# Patient Record
Sex: MALE | Race: WHITE | NOT HISPANIC OR LATINO | Employment: FULL TIME | URBAN - METROPOLITAN AREA
[De-identification: names, ages, dates, MRNs, and addresses within clinical notes are randomized per-mention and may not be internally consistent; named-entity substitution may affect disease eponyms.]

---

## 2017-08-03 ENCOUNTER — TELEPHONE (OUTPATIENT)
Dept: CARDIOLOGY | Facility: CLINIC | Age: 72
End: 2017-08-03

## 2017-08-03 DIAGNOSIS — I35.1 AORTIC VALVE INSUFFICIENCY, UNSPECIFIED ETIOLOGY: Primary | ICD-10-CM

## 2017-08-03 NOTE — TELEPHONE ENCOUNTER
The patient called requesting to have an echo prior to re-evaluation with Dr. Cotton for aortic insufficiency.  Per MRJ I advised that an echo will be ordered and he should expect a call from procedure scheduling to facilitate this.  Understanding verbalized at this time.

## 2017-09-21 ENCOUNTER — HOSPITAL ENCOUNTER (OUTPATIENT)
Dept: GENERAL RADIOLOGY | Facility: HOSPITAL | Age: 72
Discharge: HOME | End: 2017-09-21
Attending: INTERNAL MEDICINE

## 2017-09-21 ENCOUNTER — TRANSCRIBE ORDERS (OUTPATIENT)
Dept: ADMINISTRATIVE | Facility: HOSPITAL | Age: 72
End: 2017-09-21

## 2017-09-21 ENCOUNTER — HOSPITAL ENCOUNTER (OUTPATIENT)
Dept: CARDIOLOGY | Facility: HOSPITAL | Age: 72
Discharge: HOME | End: 2017-09-21
Attending: INTERNAL MEDICINE | Admitting: INTERNAL MEDICINE

## 2017-09-21 ENCOUNTER — OFFICE VISIT (OUTPATIENT)
Dept: CARDIOLOGY | Facility: CLINIC | Age: 72
End: 2017-09-21

## 2017-09-21 VITALS
HEIGHT: 70 IN | DIASTOLIC BLOOD PRESSURE: 63 MMHG | WEIGHT: 205 LBS | HEART RATE: 47 BPM | BODY MASS INDEX: 29.35 KG/M2 | SYSTOLIC BLOOD PRESSURE: 140 MMHG

## 2017-09-21 VITALS
DIASTOLIC BLOOD PRESSURE: 74 MMHG | BODY MASS INDEX: 29.63 KG/M2 | SYSTOLIC BLOOD PRESSURE: 171 MMHG | WEIGHT: 207 LBS | HEIGHT: 70 IN

## 2017-09-21 DIAGNOSIS — I38 VALVULAR HEART DISEASE: ICD-10-CM

## 2017-09-21 DIAGNOSIS — R06.09 DYSPNEA ON EXERTION: ICD-10-CM

## 2017-09-21 DIAGNOSIS — R06.02 SHORTNESS OF BREATH: ICD-10-CM

## 2017-09-21 DIAGNOSIS — R06.02 SHORTNESS OF BREATH: Primary | ICD-10-CM

## 2017-09-21 DIAGNOSIS — I35.1 AORTIC VALVE INSUFFICIENCY, UNSPECIFIED ETIOLOGY: ICD-10-CM

## 2017-09-21 DIAGNOSIS — I10 ESSENTIAL HYPERTENSION: Primary | ICD-10-CM

## 2017-09-21 LAB
BH CV ECHO MEAS - AI DEC SLOPE: 225 CM/SEC^2
BH CV ECHO MEAS - AI MAX PG: 64.2 MMHG
BH CV ECHO MEAS - AI MAX VEL: 400.1 CM/SEC
BH CV ECHO MEAS - AI P1/2T: 520.8 MSEC
BH CV ECHO MEAS - AO MAX PG (FULL): 0.55 MMHG
BH CV ECHO MEAS - AO MAX PG: 8.3 MMHG
BH CV ECHO MEAS - AO MEAN PG (FULL): 0.42 MMHG
BH CV ECHO MEAS - AO MEAN PG: 3.7 MMHG
BH CV ECHO MEAS - AO ROOT AREA (BSA CORRECTED): 1.6
BH CV ECHO MEAS - AO ROOT AREA: 9 CM^2
BH CV ECHO MEAS - AO ROOT DIAM: 3.4 CM
BH CV ECHO MEAS - AO V2 MAX: 144.3 CM/SEC
BH CV ECHO MEAS - AO V2 MEAN: 88.3 CM/SEC
BH CV ECHO MEAS - AO V2 VTI: 33.3 CM
BH CV ECHO MEAS - AVA(I,A): 4.3 CM^2
BH CV ECHO MEAS - AVA(I,D): 4.3 CM^2
BH CV ECHO MEAS - AVA(V,A): 4.1 CM^2
BH CV ECHO MEAS - AVA(V,D): 4.1 CM^2
BH CV ECHO MEAS - BSA(HAYCOCK): 2.2 M^2
BH CV ECHO MEAS - BSA: 2.1 M^2
BH CV ECHO MEAS - BZI_BMI: 29.7 KILOGRAMS/M^2
BH CV ECHO MEAS - BZI_METRIC_HEIGHT: 177.8 CM
BH CV ECHO MEAS - BZI_METRIC_WEIGHT: 93.9 KG
BH CV ECHO MEAS - CONTRAST EF (2CH): 76.3 ML/M^2
BH CV ECHO MEAS - CONTRAST EF 4CH: 73.9 ML/M^2
BH CV ECHO MEAS - EDV(CUBED): 204.4 ML
BH CV ECHO MEAS - EDV(MOD-SP2): 135 ML
BH CV ECHO MEAS - EDV(MOD-SP4): 180 ML
BH CV ECHO MEAS - EDV(TEICH): 172.6 ML
BH CV ECHO MEAS - EF(CUBED): 81.6 %
BH CV ECHO MEAS - EF(MOD-SP2): 76.3 %
BH CV ECHO MEAS - EF(MOD-SP4): 73.9 %
BH CV ECHO MEAS - EF(TEICH): 73.5 %
BH CV ECHO MEAS - ESV(CUBED): 37.7 ML
BH CV ECHO MEAS - ESV(MOD-SP2): 32 ML
BH CV ECHO MEAS - ESV(MOD-SP4): 47 ML
BH CV ECHO MEAS - ESV(TEICH): 45.8 ML
BH CV ECHO MEAS - FS: 43.1 %
BH CV ECHO MEAS - IVS/LVPW: 1
BH CV ECHO MEAS - IVSD: 0.99 CM
BH CV ECHO MEAS - LA DIMENSION: 3.9 CM
BH CV ECHO MEAS - LA/AO: 1.2
BH CV ECHO MEAS - LAT PEAK E' VEL: 5 CM/SEC
BH CV ECHO MEAS - LV DIASTOLIC VOL/BSA (35-75): 85 ML/M^2
BH CV ECHO MEAS - LV MASS(C)D: 235.1 GRAMS
BH CV ECHO MEAS - LV MASS(C)DI: 111 GRAMS/M^2
BH CV ECHO MEAS - LV MAX PG: 7.8 MMHG
BH CV ECHO MEAS - LV MEAN PG: 3.3 MMHG
BH CV ECHO MEAS - LV SYSTOLIC VOL/BSA (12-30): 22.2 ML/M^2
BH CV ECHO MEAS - LV V1 MAX: 139.4 CM/SEC
BH CV ECHO MEAS - LV V1 MEAN: 81.9 CM/SEC
BH CV ECHO MEAS - LV V1 VTI: 33.2 CM
BH CV ECHO MEAS - LVIDD: 5.9 CM
BH CV ECHO MEAS - LVIDS: 3.4 CM
BH CV ECHO MEAS - LVLD AP2: 8.2 CM
BH CV ECHO MEAS - LVLD AP4: 8.6 CM
BH CV ECHO MEAS - LVLS AP2: 6.9 CM
BH CV ECHO MEAS - LVLS AP4: 7.3 CM
BH CV ECHO MEAS - LVOT AREA (M): 4.2 CM^2
BH CV ECHO MEAS - LVOT AREA: 4.3 CM^2
BH CV ECHO MEAS - LVOT DIAM: 2.3 CM
BH CV ECHO MEAS - LVPWD: 0.98 CM
BH CV ECHO MEAS - MED PEAK E' VEL: 8.06 CM/SEC
BH CV ECHO MEAS - MV A MAX VEL: 68.2 CM/SEC
BH CV ECHO MEAS - MV DEC SLOPE: 296 CM/SEC^2
BH CV ECHO MEAS - MV DEC TIME: 0.44 SEC
BH CV ECHO MEAS - MV E MAX VEL: 54.5 CM/SEC
BH CV ECHO MEAS - MV E/A: 0.8
BH CV ECHO MEAS - MV P1/2T MAX VEL: 428 CM/SEC
BH CV ECHO MEAS - MV P1/2T: 423.5 MSEC
BH CV ECHO MEAS - MVA P1/2T LCG: 0.51 CM^2
BH CV ECHO MEAS - MVA(P1/2T): 0.52 CM^2
BH CV ECHO MEAS - PA ACC SLOPE: 485.8 CM/SEC^2
BH CV ECHO MEAS - PA ACC TIME: 0.13 SEC
BH CV ECHO MEAS - PA PR(ACCEL): 20.8 MMHG
BH CV ECHO MEAS - PULM DIAS VEL: 30.5 CM/SEC
BH CV ECHO MEAS - PULM S/D: 2.2
BH CV ECHO MEAS - PULM SYS VEL: 66.9 CM/SEC
BH CV ECHO MEAS - RVDD: 3.3 CM
BH CV ECHO MEAS - SI(AO): 141.8 ML/M^2
BH CV ECHO MEAS - SI(CUBED): 78.7 ML/M^2
BH CV ECHO MEAS - SI(LVOT): 66.8 ML/M^2
BH CV ECHO MEAS - SI(MOD-SP2): 48.6 ML/M^2
BH CV ECHO MEAS - SI(MOD-SP4): 62.8 ML/M^2
BH CV ECHO MEAS - SI(TEICH): 59.9 ML/M^2
BH CV ECHO MEAS - SV(AO): 300.4 ML
BH CV ECHO MEAS - SV(CUBED): 166.8 ML
BH CV ECHO MEAS - SV(LVOT): 141.6 ML
BH CV ECHO MEAS - SV(MOD-SP2): 103 ML
BH CV ECHO MEAS - SV(MOD-SP4): 133 ML
BH CV ECHO MEAS - SV(TEICH): 126.8 ML
BH CV ECHO MEAS - TAPSE (>1.6): 2.9 CM2
BH CV XLRA - RV BASE: 3 CM
BH CV XLRA - RV LENGTH: 7 CM
BH CV XLRA - RV MID: 3 CM
BH CV XLRA - TDI S': 14.9 CM/SEC
E/E' RATIO: 8.8
LEFT ATRIUM VOLUME INDEX: 38.2 ML/M2

## 2017-09-21 PROCEDURE — 93000 ELECTROCARDIOGRAM COMPLETE: CPT | Performed by: INTERNAL MEDICINE

## 2017-09-21 PROCEDURE — 71020 HC CHEST PA AND LATERAL: CPT

## 2017-09-21 PROCEDURE — 93306 TTE W/DOPPLER COMPLETE: CPT

## 2017-09-21 PROCEDURE — 93306 TTE W/DOPPLER COMPLETE: CPT | Performed by: INTERNAL MEDICINE

## 2017-09-21 PROCEDURE — 99203 OFFICE O/P NEW LOW 30 MIN: CPT | Performed by: INTERNAL MEDICINE

## 2017-09-21 RX ORDER — AMLODIPINE BESYLATE 10 MG/1
10 TABLET ORAL DAILY
Qty: 30 TABLET | Refills: 11 | Status: SHIPPED | OUTPATIENT
Start: 2017-09-21 | End: 2017-10-18

## 2017-09-21 RX ORDER — VALSARTAN 320 MG/1
320 TABLET ORAL DAILY
COMMUNITY
Start: 2017-09-10 | End: 2018-09-25

## 2017-09-21 RX ORDER — CHLORTHALIDONE 50 MG/1
50 TABLET ORAL DAILY
Qty: 30 TABLET | Refills: 11 | Status: SHIPPED | OUTPATIENT
Start: 2017-09-21 | End: 2020-10-06 | Stop reason: SDUPTHER

## 2017-09-21 RX ORDER — DICLOFENAC SODIUM 75 MG/1
75 TABLET, DELAYED RELEASE ORAL DAILY
COMMUNITY
Start: 2017-08-01

## 2017-09-21 RX ORDER — TADALAFIL 5 MG
5 TABLET ORAL DAILY
COMMUNITY
Start: 2017-08-20

## 2017-09-21 NOTE — PROGRESS NOTES
[facility]  INITIAL OFFICE CONSULT    Suad Perry  : 1945  MRN:[id number]  Home Phone:[phone]  Patient Address: [address]   [place] [address]    Date of Encounter: 2017    PCP: Rima Adams MD  [id number]   [facility] [place] [facility] [address]  Referring MD: Dr. Adams    IDENTIFICATION: A 71 y.o.  white male, , resident of [place], [place]     Chief Complaint   Patient presents with   • Shortness of Breath       PROBLEM LIST:   1. Dyspnea on exertion:  a. Cardiopulmonary exercise testing (CPX) done 10/02/2014 showing normal spirometry and exercise tolerance, stopped test due to blood pressure of 205/104 after only 5 minutes.   b. Left heart catheterization, 2014:  Showing normal LV function, normal pulmonary artery pressures, normal coronary arteries and moderate to severe aortic insufficiency.    2. Valvular heart disease:   a. Echocardiogram, 2014:  Ejection fraction of 55% to 60%, bicuspid aortic valve with moderate aortic insufficiency, mild tricuspid regurgitation, trace mitral regurgitation, RVSP of 25-30 mmHg.    b. Transesophageal echocardiogram, 2014:  Showing bicuspid aortic valve with moderate aortic insufficiency, mild mitral regurgitation with bileaflet prolapse.    c. Echocardiogram 2017: Normal EF, mild MR, aortic valve sclerosis with mild AI, no PAH  3. Hypertension  4. Benign prostatic hypertrophy/nocturia.   5. Osteoarthritis.   6. Macular degeneration.   7. Suspected obstructive sleep apnea, refused sleep study.   8. History of hypothyroidism, “low functioning” thyroid:   a. No longer on medication with a normal TSH.    9. Surgical history:   a. Bilateral knee replacement.   b. Right inguinal hernia repair.      ALLERGIES: No Known Allergies    CURRENT MEDICATIONS:   •  CIALIS 5 MG tablet,  Daily  •  Diclofenac 75 MG EC tablet, Daily.  •  valsartan 320 MG tablet, Daily.    HPI: Mr. Perry  is a pleasant 70 y/o WM with history of VHD, and HTN who presents to re-establish care for increased VILLEGAS. He was previously evaluated in our office for VILLEGAS and was found to have normal coronaries, and moderate AI. He has noticed increased shortness of breath with exertion, only when walking up an incline or going up a flight of stairs. He does not get short of breath when walking on a flat surface. He also notices increased shortness of breath with bending over to tie his shoes. He remains active and works out 2 times per week. He denies chest pain, palpitations, syncope or near syncope. Denies orthopnea, PND or lower extremity edema. He continues to work as a . He does not check his blood pressure at home, and is surprised it is this high today. No tobacco, occasional alcohol use. No significant family history of heart disease. Denies history of CVA, MI, DVT/PE or rheumatic fever.      Cardiac Risk Factors include: advanced age (older than 55 for men, 65 for women), hypertension and male gender    ROS: All systems have been reviewed and are negative with the exception of those mentioned in the HPI and problem list above.    Surgical History:   Past Surgical History:   Procedure Laterality Date   • HERNIA REPAIR      Right inguinal hernia repair.     • REPLACEMENT TOTAL KNEE BILATERAL         Social History:   Social History     Social History   • Marital status:      Spouse name: N/A   • Number of children: N/A   • Years of education: N/A     Occupational History   • Not on file.     Social History Main Topics   • Smoking status: Never Smoker   • Smokeless tobacco: Never Used   • Alcohol use 1.8 - 3.6 oz/week     1 - 2 Glasses of wine, 1 - 2 Cans of beer, 1 - 2 Shots of liquor per week      Comment: weekends   • Drug use: No   • Sexual activity: Defer       Family History:   Family History   Problem Relation Age of Onset   • Osteoarthritis Mother    • Heart disease Father      CAD   •  "Hypertension Sister    • Hypertension Sister        Objective     Vitals:    09/21/17 1118 09/21/17 1124 09/21/17 1125 09/21/17 1126   BP: (!) 199/70 (!) 185/70 174/64 140/63   BP Location: Right arm Right arm Left arm Left arm   Patient Position: Sitting Standing Sitting Standing   Pulse: (!) 44 (!) 46 (!) 43 (!) 47   Weight: 205 lb (93 kg)      Height: 70\" (177.8 cm)        Body mass index is 29.41 kg/(m^2).    PHYSICAL EXAM:  Constitutional:  Well-nourished, cooperative, in no acute distress.   Head:  Normocephalic, without obvious abnormality, atraumatic.   Neck: No adenopathy, supple, trachea midline, no thyromegaly, no JVD, murmur radiating to carotids   Respiratory:   Clear to auscultation bilaterally; respirations regular, even and unlabored. No wheezes, rales or ronchi.    Cardiovascular:  Regular rhythm and normal rate, II/VI systolic murmur, no gallop, no rub, no click.   Pulses: Peripheral pulses are present and equal bilaterally.   GI:   Soft, non-distended. Bowel sounds heard throughout.  Non-tender to palpation, no guarding.   Extremities: No edema, clubbing or cyanosis.   Skin: Skin is warm and dry. No bleeding, bruising or rash.   Neurological: Alert, oriented to time, person and place. No focal deficits.     Labs/Diagnostic Data    TSH 5/3/2017: 3.43      ECG 12 Lead  Date/Time: 9/21/2017 3:33 PM  Performed by: ASIYA ROSARIO  Authorized by: ASIYA ROSARIO   Rhythm: sinus bradycardia  Rate: bradycardic  BPM: 41  Conduction: 1st degree  ST Segments: ST segments normal  T Waves: T waves normal  Clinical impression: abnormal ECG        Radiology Data:   CXR 9/21/17: FINDINGS: Cardiac size enlarged without increased pulmonary vascularity,  focal airspace opacity or overt edema. No pneumothorax or significant  pleural effusion. No acute osseous abnormality.          IMPRESSION:  Cardiomegaly without evidence of overt edema or focal  pulmonary opacification.          Assessment and Plan: "     1. His echocardiogram today shows mild left sided chamber enlargement, with normal EF. He has mild AI and mild MR.   2. His blood pressure is too high today which may be contributing to his symptoms. We will begin Amlodipine 10mg daily and Chlorthalidone 50mg daily.   3. He is to keep a record of his blood pressures and call Radha with these readings in a few weeks.    Thank you for allowing me to participate in the care of Suad Perry. Feel free to contact me directly with any further questions or concerns.    Scribed for Daryl Cotton MD by Haley Rivas PA-C. 9/21/2017  11:48 AM   I, Daryl Cotton MD, [facility], [facility], personally performed the services described in this documentation as scribed by the above named individual in my presence, and it is both accurate and complete. At 9:34 AM on 09/21/2017

## 2017-10-18 NOTE — PROGRESS NOTES
"Spoke with pt after BP log received via fax. He has been taking his valsartan 320 mg daily and if Am BP > 140 he takes chlorthalidone 25 mg. BP seems to be at \"target\" < 140 systolic. He will continue to monitor and call should it rise > 140 consistently. KH  "

## 2017-11-01 ENCOUNTER — OFFICE VISIT (OUTPATIENT)
Dept: ORTHOPEDIC SURGERY | Facility: CLINIC | Age: 72
End: 2017-11-01

## 2017-11-01 VITALS
BODY MASS INDEX: 28.77 KG/M2 | HEART RATE: 55 BPM | SYSTOLIC BLOOD PRESSURE: 132 MMHG | DIASTOLIC BLOOD PRESSURE: 63 MMHG | WEIGHT: 201 LBS | HEIGHT: 70 IN

## 2017-11-01 DIAGNOSIS — Z09 POSTOPERATIVE EXAMINATION: ICD-10-CM

## 2017-11-01 DIAGNOSIS — Z96.653 S/P TOTAL KNEE ARTHROPLASTY, BILATERAL: Primary | ICD-10-CM

## 2017-11-01 PROCEDURE — 99213 OFFICE O/P EST LOW 20 MIN: CPT | Performed by: ORTHOPAEDIC SURGERY

## 2017-11-01 RX ORDER — PREDNISOLONE ACETATE 10 MG/ML
SUSPENSION/ DROPS OPHTHALMIC 3 TIMES DAILY
COMMUNITY
Start: 2017-10-02

## 2017-11-01 NOTE — PROGRESS NOTES
[facility]    Subjective     Chief Complaint   Patient presents with   • Follow-up     4 years Bilateral TKA's 10/29/13        HPI    Suad Perry is a 71 y.o. male. He follows up today status post bilateral total knee arthroplasties 4 years ago.  He is doing well today.  Excellent improvement compared to his preoperative symptoms.  He is doing full activities, without difficulty.  No pain in the knees.  Over 100% improvement compared to his preoperative symptoms in both knees.      Patient Active Problem List   Diagnosis   • Hypertension   • Dyspnea on exertion   • Valvular heart disease   • BPH (benign prostatic hypertrophy)   • Osteoarthritis   • Macular degeneration   • YUNG (obstructive sleep apnea)   • Hypothyroidism     Past Medical History:   Diagnosis Date   • BPH (benign prostatic hypertrophy)     Nocturia   • Dyspnea on exertion    • Hypertension     Recently diagnosed 6 months ago   • Hypothyroidism     History of hypothyroidism, “low functioning” thyroid: No longer on medication with normal TSH   • Macular degeneration    • YUNG (obstructive sleep apnea)     Suspected obstructive sleep apnea, refused sleep study.    • Osteoarthritis    • Valvular heart disease       Past Surgical History:   Procedure Laterality Date   • HERNIA REPAIR      Right inguinal hernia repair.     • REPLACEMENT TOTAL KNEE BILATERAL        Family History   Problem Relation Age of Onset   • Osteoarthritis Mother    • Hypertension Mother    • Heart disease Father      CAD   • Hypertension Sister    • Hypertension Sister      Social History     Social History   • Marital status:      Spouse name: N/A   • Number of children: N/A   • Years of education: N/A     Occupational History   • Not on file.     Social History Main Topics   • Smoking status: Never Smoker   • Smokeless tobacco: Never Used   • Alcohol use 1.8 - 3.6 oz/week     1 - 2 Glasses of wine, 1 - 2 Cans of beer, 1 - 2 Shots of liquor per  week      Comment: weekends   • Drug use: No   • Sexual activity: Defer     Other Topics Concern   • Not on file     Social History Narrative      Current Outpatient Prescriptions on File Prior to Visit   Medication Sig Dispense Refill   • budesonide-formoterol (SYMBICORT) 160-4.5 MCG/ACT inhaler Inhale Take As Directed.     • chlorthalidone (HYGROTEN) 50 MG tablet Take 1 tablet by mouth Daily. 30 tablet 11   • CIALIS 5 MG tablet Take 5 mg by mouth Daily.     • diclofenac (VOLTAREN) 75 MG EC tablet Take 75 mg by mouth Daily.     • lisinopril (PRINIVIL,ZESTRIL) 5 MG tablet Take  by mouth Take As Directed.     • Misc Natural Products (OSTEO BI-FLEX JOINT SHIELD PO) Take  by mouth.     • Multiple Vitamin (MULTI VITAMIN PO) Take 1 tablet by mouth Take As Directed.     • valsartan (DIOVAN) 320 MG tablet Take 320 mg by mouth Daily.     • [DISCONTINUED] Bioflavonoid Products (BIOFLEX PO) Take  by mouth Take As Directed.     • [DISCONTINUED] Multiple Vitamins-Minerals (MULTI COMPLETE PO) Take  by mouth.       No current facility-administered medications on file prior to visit.       Allergies   Allergen Reactions   • No Known Drug Allergy         Review of Systems   Constitutional: Negative for activity change, appetite change, chills, diaphoresis, fatigue, fever and unexpected weight change.   HENT: Negative for congestion, dental problem, drooling, ear discharge, ear pain, facial swelling, hearing loss, mouth sores, nosebleeds, postnasal drip, rhinorrhea, sinus pressure, sneezing, sore throat, tinnitus, trouble swallowing and voice change.    Eyes: Negative for photophobia, pain, discharge, redness, itching and visual disturbance.   Respiratory: Positive for shortness of breath. Negative for apnea, cough, choking, chest tightness, wheezing and stridor.    Cardiovascular: Positive for palpitations. Negative for chest pain and leg swelling.   Gastrointestinal: Negative for abdominal distention, abdominal pain, anal  "bleeding, blood in stool, constipation, diarrhea, nausea, rectal pain and vomiting.   Endocrine: Negative for cold intolerance, heat intolerance, polydipsia, polyphagia and polyuria.   Genitourinary: Positive for frequency and urgency. Negative for decreased urine volume, difficulty urinating, dysuria, enuresis, flank pain, genital sores and hematuria.   Musculoskeletal: Negative for arthralgias, back pain, gait problem, joint swelling, myalgias, neck pain and neck stiffness.        Knee replacements   Skin: Negative for color change, pallor, rash and wound.   Allergic/Immunologic: Negative for environmental allergies, food allergies and immunocompromised state.   Neurological: Negative for dizziness, tremors, seizures, syncope, facial asymmetry, speech difficulty, weakness, light-headedness, numbness and headaches.   Hematological: Negative for adenopathy. Does not bruise/bleed easily.   Psychiatric/Behavioral: Negative for agitation, behavioral problems, confusion, decreased concentration, dysphoric mood, hallucinations, self-injury, sleep disturbance and suicidal ideas. The patient is not nervous/anxious and is not hyperactive.         Objective      Physical Exam  /63  Pulse 55  Ht 70\" (177.8 cm)  Wt 201 lb (91.2 kg)  BMI 28.84 kg/m2    Body mass index is 28.84 kg/(m^2).    General:   Mental Status:  Alert   Appearance: Cooperative, in no acute distress   Build and Nutrition: Well-nourished and well developed male   Orientation: Alert and oriented to person, place and time   Posture: Normal   Gait: Normal    Integument:   Right knee: Wound is well-healed   Left knee: Wound is well-healed     Lower Extremities:   Right Knee:    Tenderness:  No medial or lateral joint line tenderness    Effusion:  None    Swelling:  None    Crepitus:  None    Atrophy:  None    Range of motion:  Extension: 0°       Flexion: 120°  Instability:  No varus laxity, no valgus laxity, negative anterior drawer  Deformities: "  None   Left Knee:    Tenderness:  No medial or lateral joint line tenderness    Effusion:  None    Swelling:  None    Crepitus: None    Atrophy: None    Range of motion:  Extension: 0°       Flexion: 120°  Instability:  No varus laxity, no valgus laxity, negative anterior drawer  Deformities:  None      Imaging/Studies  Imaging Results (last 24 hours)     Procedure Component Value Units Date/Time    XR Knee 3 View Bilateral [[id number]] Resulted:  11/01/17 0859     Updated:  11/01/17 0900    Narrative:       Right Knee Radiographs  Indication: status-post right total knee arthroplasty  Views: AP, lateral, and sunrise views of the right knee    Comparison: no change compared to prior study    Findings:   The components are well aligned, with no signs of loosening or failure.    Left Knee Radiographs  Indication: status-post left total knee arthroplasty  Views: AP, lateral, and sunrise views of the left knee    Comparison: no change compared to prior study    Findings:   The components are well aligned, with no signs of loosening or failure.            Assessment and Plan     Ora was seen today for follow-up.    Diagnoses and all orders for this visit:    S/P total knee arthroplasty, bilateral  -     XR Knee 3 View Bilateral    Postoperative examination      I reviewed my findings with patient today.  Both of his total knee arthroplasties are functioning well.  I will see him back in 5 years with x-rays, but sooner for any problems.    Return in about 5 years (around 11/1/2022) for Recheck with X-Rays.      Medical Decision Making    Data/Risk: radiology tests and independent visualization of imaging, lab tests, or EMG/NCV      Daryl Reyes MD  11/01/17  9:10 AM

## 2017-11-02 ENCOUNTER — TELEPHONE (OUTPATIENT)
Dept: ORTHOPEDIC SURGERY | Facility: CLINIC | Age: 72
End: 2017-11-02

## 2017-11-02 NOTE — TELEPHONE ENCOUNTER
PATIENT WOULD LIKE SOMEONE TO CALL HIM TO CLARIFY THE NECESSITY OF DISCONTINUING TWO MEDICATIONS THAT HE AND DR MELTON DISCUSSED. HE CAN BE REACHED [phone]

## 2017-11-02 NOTE — TELEPHONE ENCOUNTER
I spoke with pt. He said you recommended him d/c the multivitamin and osteo bi-flex, and wants to know why. Please advise. Thanks!

## 2017-11-07 NOTE — TELEPHONE ENCOUNTER
"I called him back.  He just noticed that in \"My Chart\", when he went on the website, that those medications were discontinued.  He may continue those medications, and I called him back.  Can we check into why those medications were marked discontinued?  "

## 2018-08-13 RX ORDER — IRBESARTAN 300 MG/1
300 TABLET ORAL DAILY
Qty: 90 TABLET | Refills: 1 | Status: SHIPPED | OUTPATIENT
Start: 2018-08-13 | End: 2018-12-21 | Stop reason: RX

## 2018-09-18 ENCOUNTER — TELEPHONE (OUTPATIENT)
Dept: CARDIOLOGY | Facility: CLINIC | Age: 73
End: 2018-09-18

## 2018-09-18 NOTE — TELEPHONE ENCOUNTER
Pt given fax number for BP log to be faxed. Received and will address at appt on 9/25/2018. HAYDEN

## 2018-09-25 ENCOUNTER — OFFICE VISIT (OUTPATIENT)
Dept: CARDIOLOGY | Facility: CLINIC | Age: 73
End: 2018-09-25

## 2018-09-25 VITALS
HEART RATE: 54 BPM | BODY MASS INDEX: 29.06 KG/M2 | SYSTOLIC BLOOD PRESSURE: 123 MMHG | WEIGHT: 203 LBS | HEIGHT: 70 IN | DIASTOLIC BLOOD PRESSURE: 58 MMHG

## 2018-09-25 DIAGNOSIS — I10 ESSENTIAL HYPERTENSION: Primary | ICD-10-CM

## 2018-09-25 DIAGNOSIS — I38 VALVULAR HEART DISEASE: ICD-10-CM

## 2018-09-25 PROCEDURE — 99213 OFFICE O/P EST LOW 20 MIN: CPT | Performed by: INTERNAL MEDICINE

## 2018-09-25 RX ORDER — CHLORTHALIDONE 25 MG/1
25 TABLET ORAL DAILY
Qty: 30 TABLET | Refills: 11 | Status: SHIPPED | OUTPATIENT
Start: 2018-09-25 | End: 2019-10-03 | Stop reason: SDUPTHER

## 2018-09-25 RX ORDER — AMLODIPINE BESYLATE 5 MG/1
5 TABLET ORAL DAILY
COMMUNITY
End: 2020-10-06

## 2018-09-25 NOTE — PROGRESS NOTES
OFFICE FOLLOW UP     Date of Encounter:2018     Name: Suad Perry  : 1945  Address: [address] [place] [address]  [facility] [address]  Home Phone:[phone]    PCP: Rima Adams MD  [address] [facility]   [facility] [address]    Suad Perry is a 72 y.o. male.    Chief Complaint: Follow up of VHD, HTN    Problem List:   1. Dyspnea on exertion:  a. Cardiopulmonary exercise testing (CPX) done 10/02/2014 showing normal spirometry and exercise tolerance, stopped test due to blood pressure of 205/104 after only 5 minutes.   b. Left heart catheterization, 2014:  Showing normal LV function, normal pulmonary artery pressures, normal coronary arteries and moderate to severe aortic insufficiency.    2. Valvular heart disease:   a. Echocardiogram, 2014:  Ejection fraction of 55% to 60%, bicuspid aortic valve with moderate aortic insufficiency, mild tricuspid regurgitation, trace mitral regurgitation, RVSP of 25-30 mmHg.    b. Transesophageal echocardiogram, 2014:  Showing bicuspid aortic valve with moderate aortic insufficiency, mild mitral regurgitation with bileaflet prolapse.    c. Echocardiogram 2017: Normal EF, mild MR, aortic valve sclerosis with mild AI, no PAH  3. Hypertension  4. Benign prostatic hypertrophy/nocturia.   5. Osteoarthritis.   6. Macular degeneration.   7. Suspected obstructive sleep apnea, refused sleep study.   8. History of hypothyroidism, “low functioning” thyroid:   a. No longer on medication with a normal TSH.    9. Surgical history:   a. Bilateral knee replacement.   b. Right inguinal hernia repair.      Allergies   Allergen Reactions   • No Known Drug Allergy      Current Medications:  •  amLODIPine 10 mg by mouth Daily.   •  CIALIS 5 mg by mouth Daily.  •  diclofenac 75 mg by mouth Daily.  •  Irbesartan 300 MG by mouth Daily  •  OSTEO BI-FLEX JOINT SHIELD by mouth Daily  •  Multiple Vitamin by mouth Daily.   •  prednisoLONE acetate 1 % ophthalmic  "suspension, Administer to both eyes 3 (Three) Times a Day.    History of Present Illness:           Mr. Perry returns today for yearly follow up. He reports persistent exertional dyspnea with walking mostly up an incline, and feels like this may be slightly worse than last year. An echocardiogram last year revealed normal EF with only mild AI. He denies angina, palpitations, syncope or heart failure symptoms. No orthopnea, PND or LE edema. He brings in a blood pressure log from home which demonstrates elevated blood pressures. He did not start the Chlorthalidone prescribed last year as he read the side effects and was afraid to take it due to these.      The following portions of the patient's history were reviewed and updated as appropriate: allergies, current medications and problem list.    ROS: Pertinent positives as listed in the HPI.  All other systems reviewed and negative.    Objective:  Vitals:    09/25/18 1104 09/25/18 1105   BP: 132/68 123/58   BP Location: Left arm Left arm   Patient Position: Sitting Standing   Pulse: (!) 49 54   Weight: 92.1 kg (203 lb) 92.1 kg (203 lb)   Height: 177.8 cm (70\") 177.8 cm (70\")     Physical Exam:  GENERAL: Alert, cooperative, in no acute distress.   HEENT: Normocephalic, no adenopathy, no jugular venous distention  HEART: No discrete PMI is noted. Regular rhythm, normal rate, and 2/6 basilar systolic murmur, no AI murmur, no gallops, or rubs.   LUNGS: Clear to auscultation bilaterally. No wheezing, rales or ronchi.  ABDOMEN: Soft, bowel sounds present, non-tender   NEUROLOGIC: No focal abnormalities involving strength or sensation are noted.   EXTREMITIES: No clubbing, cyanosis, or edema noted.     Diagnostic Data:  No new labs available to review.     Procedures    Assessment and Plan:   1.  VHD: He has a basilar systolic murmur on exam, I do not hear an AI murmur. He has stable exertional dyspnea and we will see him back in 1 year with an echo at that time.   2.  HTN: " elevated historically by home BP readings. We will add Chlorthalidone 25mg daily, and discussed risks and benefits of this medicine and he agrees to take it.         I will see Suad Perry back in one year or sooner on an as needed basis.      Scribed for Daryl Cotton MD by Haley Rivas PA-C. 09/25/2018 11:09 AM.    I, Daryl Cotton MD, [facility], [facility], personally performed the services described in this documentation as scribed by the above named individual in my presence, and it is both accurate and complete. At 5:49 PM on 09/25/2018

## 2018-12-10 ENCOUNTER — DOCUMENTATION (OUTPATIENT)
Dept: CARDIOLOGY | Facility: CLINIC | Age: 73
End: 2018-12-10

## 2018-12-18 RX ORDER — AMLODIPINE BESYLATE 10 MG/1
TABLET ORAL
Qty: 90 TABLET | Refills: 3 | Status: SHIPPED | OUTPATIENT
Start: 2018-12-18 | End: 2019-10-01

## 2018-12-21 RX ORDER — VALSARTAN 320 MG/1
320 TABLET ORAL DAILY
Qty: 90 TABLET | Refills: 3 | Status: SHIPPED | OUTPATIENT
Start: 2018-12-21 | End: 2019-12-27

## 2018-12-21 NOTE — PROGRESS NOTES
Valsartan is no longer on recall and Irbesartan is now on backorder. Pt agreeable to change back to valsartan.

## 2019-09-11 DIAGNOSIS — I38 VALVULAR HEART DISEASE: Primary | ICD-10-CM

## 2019-09-29 ENCOUNTER — HOSPITAL ENCOUNTER (EMERGENCY)
Facility: HOSPITAL | Age: 74
Discharge: HOME | End: 2019-09-29
Attending: EMERGENCY MEDICINE | Admitting: EMERGENCY MEDICINE

## 2019-09-29 ENCOUNTER — APPOINTMENT (OUTPATIENT)
Dept: GENERAL RADIOLOGY | Facility: HOSPITAL | Age: 74
End: 2019-09-29

## 2019-09-29 VITALS
DIASTOLIC BLOOD PRESSURE: 68 MMHG | HEIGHT: 70 IN | SYSTOLIC BLOOD PRESSURE: 132 MMHG | BODY MASS INDEX: 28.49 KG/M2 | WEIGHT: 199 LBS | OXYGEN SATURATION: 95 % | HEART RATE: 48 BPM | TEMPERATURE: 97.5 F | RESPIRATION RATE: 14 BRPM

## 2019-09-29 DIAGNOSIS — R07.9 CHEST PAIN, UNSPECIFIED TYPE: Primary | ICD-10-CM

## 2019-09-29 DIAGNOSIS — R09.1 PLEURISY: ICD-10-CM

## 2019-09-29 LAB
ALBUMIN SERPL-MCNC: 4.1 G/DL (ref 3.5–5.2)
ALBUMIN/GLOB SERPL: 1.7 G/DL
ALP SERPL-CCNC: 57 U/L (ref 39–117)
ALT SERPL W P-5'-P-CCNC: 16 U/L (ref 1–41)
ANION GAP SERPL CALCULATED.3IONS-SCNC: 11 MMOL/L (ref 5–15)
AST SERPL-CCNC: 18 U/L (ref 1–40)
BASOPHILS # BLD AUTO: 0.03 10*3/MM3 (ref 0–0.2)
BASOPHILS NFR BLD AUTO: 0.4 % (ref 0–1.5)
BILIRUB SERPL-MCNC: 0.3 MG/DL (ref 0.2–1.2)
BUN BLD-MCNC: 19 MG/DL (ref 8–23)
BUN/CREAT SERPL: 18.8 (ref 7–25)
CALCIUM SPEC-SCNC: 9 MG/DL (ref 8.6–10.5)
CHLORIDE SERPL-SCNC: 104 MMOL/L (ref 98–107)
CO2 SERPL-SCNC: 25 MMOL/L (ref 22–29)
CREAT BLD-MCNC: 1.01 MG/DL (ref 0.76–1.27)
DEPRECATED RDW RBC AUTO: 41.6 FL (ref 37–54)
EOSINOPHIL # BLD AUTO: 0.17 10*3/MM3 (ref 0–0.4)
EOSINOPHIL NFR BLD AUTO: 2.4 % (ref 0.3–6.2)
ERYTHROCYTE [DISTWIDTH] IN BLOOD BY AUTOMATED COUNT: 11.9 % (ref 12.3–15.4)
GFR SERPL CREATININE-BSD FRML MDRD: 72 ML/MIN/1.73
GLOBULIN UR ELPH-MCNC: 2.4 GM/DL
GLUCOSE BLD-MCNC: 101 MG/DL (ref 65–99)
HCT VFR BLD AUTO: 37.4 % (ref 37.5–51)
HGB BLD-MCNC: 12.5 G/DL (ref 13–17.7)
HOLD SPECIMEN: NORMAL
HOLD SPECIMEN: NORMAL
IMM GRANULOCYTES # BLD AUTO: 0.04 10*3/MM3 (ref 0–0.05)
IMM GRANULOCYTES NFR BLD AUTO: 0.6 % (ref 0–0.5)
LIPASE SERPL-CCNC: 23 U/L (ref 13–60)
LYMPHOCYTES # BLD AUTO: 1.88 10*3/MM3 (ref 0.7–3.1)
LYMPHOCYTES NFR BLD AUTO: 26.2 % (ref 19.6–45.3)
MCH RBC QN AUTO: 31.4 PG (ref 26.6–33)
MCHC RBC AUTO-ENTMCNC: 33.4 G/DL (ref 31.5–35.7)
MCV RBC AUTO: 94 FL (ref 79–97)
MONOCYTES # BLD AUTO: 0.79 10*3/MM3 (ref 0.1–0.9)
MONOCYTES NFR BLD AUTO: 11 % (ref 5–12)
NEUTROPHILS # BLD AUTO: 4.27 10*3/MM3 (ref 1.7–7)
NEUTROPHILS NFR BLD AUTO: 59.4 % (ref 42.7–76)
NRBC BLD AUTO-RTO: 0 /100 WBC (ref 0–0.2)
NT-PROBNP SERPL-MCNC: 174.3 PG/ML (ref 5–900)
PLATELET # BLD AUTO: 191 10*3/MM3 (ref 140–450)
PMV BLD AUTO: 10.9 FL (ref 6–12)
POTASSIUM BLD-SCNC: 4 MMOL/L (ref 3.5–5.2)
PROT SERPL-MCNC: 6.5 G/DL (ref 6–8.5)
RBC # BLD AUTO: 3.98 10*6/MM3 (ref 4.14–5.8)
SODIUM BLD-SCNC: 140 MMOL/L (ref 136–145)
TROPONIN T SERPL-MCNC: <0.01 NG/ML (ref 0–0.03)
WBC NRBC COR # BLD: 7.18 10*3/MM3 (ref 3.4–10.8)
WHOLE BLOOD HOLD SPECIMEN: NORMAL
WHOLE BLOOD HOLD SPECIMEN: NORMAL

## 2019-09-29 PROCEDURE — 84484 ASSAY OF TROPONIN QUANT: CPT | Performed by: EMERGENCY MEDICINE

## 2019-09-29 PROCEDURE — 80053 COMPREHEN METABOLIC PANEL: CPT | Performed by: EMERGENCY MEDICINE

## 2019-09-29 PROCEDURE — 83690 ASSAY OF LIPASE: CPT | Performed by: EMERGENCY MEDICINE

## 2019-09-29 PROCEDURE — 71045 X-RAY EXAM CHEST 1 VIEW: CPT

## 2019-09-29 PROCEDURE — 96374 THER/PROPH/DIAG INJ IV PUSH: CPT

## 2019-09-29 PROCEDURE — 25010000002 KETOROLAC TROMETHAMINE PER 15 MG: Performed by: EMERGENCY MEDICINE

## 2019-09-29 PROCEDURE — 99284 EMERGENCY DEPT VISIT MOD MDM: CPT

## 2019-09-29 PROCEDURE — 83880 ASSAY OF NATRIURETIC PEPTIDE: CPT | Performed by: EMERGENCY MEDICINE

## 2019-09-29 PROCEDURE — 93005 ELECTROCARDIOGRAM TRACING: CPT | Performed by: EMERGENCY MEDICINE

## 2019-09-29 PROCEDURE — 85025 COMPLETE CBC W/AUTO DIFF WBC: CPT | Performed by: EMERGENCY MEDICINE

## 2019-09-29 RX ORDER — KETOROLAC TROMETHAMINE 15 MG/ML
15 INJECTION, SOLUTION INTRAMUSCULAR; INTRAVENOUS ONCE
Status: COMPLETED | OUTPATIENT
Start: 2019-09-29 | End: 2019-09-29

## 2019-09-29 RX ORDER — ASPIRIN 81 MG/1
324 TABLET, CHEWABLE ORAL ONCE
Status: COMPLETED | OUTPATIENT
Start: 2019-09-29 | End: 2019-09-29

## 2019-09-29 RX ORDER — SODIUM CHLORIDE 0.9 % (FLUSH) 0.9 %
10 SYRINGE (ML) INJECTION AS NEEDED
Status: DISCONTINUED | OUTPATIENT
Start: 2019-09-29 | End: 2019-09-29 | Stop reason: HOSPADM

## 2019-09-29 RX ADMIN — KETOROLAC TROMETHAMINE 15 MG: 15 INJECTION, SOLUTION INTRAMUSCULAR; INTRAVENOUS at 09:18

## 2019-09-29 RX ADMIN — ASPIRIN 81 MG 324 MG: 81 TABLET ORAL at 07:20

## 2019-10-01 ENCOUNTER — OFFICE VISIT (OUTPATIENT)
Dept: CARDIOLOGY | Facility: CLINIC | Age: 74
End: 2019-10-01

## 2019-10-01 ENCOUNTER — HOSPITAL ENCOUNTER (OUTPATIENT)
Dept: CARDIOLOGY | Facility: HOSPITAL | Age: 74
Discharge: HOME | End: 2019-10-01
Admitting: INTERNAL MEDICINE

## 2019-10-01 VITALS
BODY MASS INDEX: 29.35 KG/M2 | HEIGHT: 70 IN | HEART RATE: 74 BPM | WEIGHT: 205 LBS | SYSTOLIC BLOOD PRESSURE: 95 MMHG | DIASTOLIC BLOOD PRESSURE: 54 MMHG

## 2019-10-01 VITALS — BODY MASS INDEX: 28.49 KG/M2 | WEIGHT: 199 LBS | HEIGHT: 70 IN

## 2019-10-01 DIAGNOSIS — I38 VALVULAR HEART DISEASE: Primary | ICD-10-CM

## 2019-10-01 DIAGNOSIS — I38 VALVULAR HEART DISEASE: ICD-10-CM

## 2019-10-01 DIAGNOSIS — I10 ESSENTIAL HYPERTENSION: ICD-10-CM

## 2019-10-01 LAB
BH CV ECHO MEAS - AI DEC SLOPE: 188.3 CM/SEC^2
BH CV ECHO MEAS - AI MAX PG: 49.6 MMHG
BH CV ECHO MEAS - AI MAX VEL: 351.7 CM/SEC
BH CV ECHO MEAS - AI P1/2T: 546.9 MSEC
BH CV ECHO MEAS - AO ROOT AREA (BSA CORRECTED): 2
BH CV ECHO MEAS - AO ROOT AREA: 13.9 CM^2
BH CV ECHO MEAS - AO ROOT DIAM: 4.2 CM
BH CV ECHO MEAS - BSA(HAYCOCK): 2.1 M^2
BH CV ECHO MEAS - BSA: 2.1 M^2
BH CV ECHO MEAS - BZI_BMI: 28.8 KILOGRAMS/M^2
BH CV ECHO MEAS - BZI_METRIC_HEIGHT: 177 CM
BH CV ECHO MEAS - BZI_METRIC_WEIGHT: 90.3 KG
BH CV ECHO MEAS - EDV(CUBED): 125 ML
BH CV ECHO MEAS - EDV(MOD-SP2): 91.3 ML
BH CV ECHO MEAS - EDV(MOD-SP4): 178 ML
BH CV ECHO MEAS - EDV(TEICH): 118.2 ML
BH CV ECHO MEAS - EF(CUBED): 73.8 %
BH CV ECHO MEAS - EF(MOD-BP): 59.4 %
BH CV ECHO MEAS - EF(MOD-SP2): 56.3 %
BH CV ECHO MEAS - EF(MOD-SP4): 64.1 %
BH CV ECHO MEAS - EF(TEICH): 65.4 %
BH CV ECHO MEAS - ESV(CUBED): 32.8 ML
BH CV ECHO MEAS - ESV(MOD-SP2): 39.9 ML
BH CV ECHO MEAS - ESV(MOD-SP4): 63.9 ML
BH CV ECHO MEAS - ESV(TEICH): 41 ML
BH CV ECHO MEAS - FS: 36 %
BH CV ECHO MEAS - IVS/LVPW: 1
BH CV ECHO MEAS - IVSD: 1.3 CM
BH CV ECHO MEAS - LA DIMENSION: 4.9 CM
BH CV ECHO MEAS - LA/AO: 1.2
BH CV ECHO MEAS - LAD MAJOR: 7 CM
BH CV ECHO MEAS - LAT PEAK E' VEL: 12 CM/SEC
BH CV ECHO MEAS - LATERAL E/E' RATIO: 6.3
BH CV ECHO MEAS - LV DIASTOLIC VOL/BSA (35-75): 85.7 ML/M^2
BH CV ECHO MEAS - LV MASS(C)D: 261.8 GRAMS
BH CV ECHO MEAS - LV MASS(C)DI: 126.1 GRAMS/M^2
BH CV ECHO MEAS - LV MAX PG: 4.6 MMHG
BH CV ECHO MEAS - LV MEAN PG: 2 MMHG
BH CV ECHO MEAS - LV SYSTOLIC VOL/BSA (12-30): 30.8 ML/M^2
BH CV ECHO MEAS - LV V1 MAX: 107 CM/SEC
BH CV ECHO MEAS - LV V1 MEAN: 61.6 CM/SEC
BH CV ECHO MEAS - LV V1 VTI: 23.6 CM
BH CV ECHO MEAS - LVIDD: 5 CM
BH CV ECHO MEAS - LVIDS: 3.2 CM
BH CV ECHO MEAS - LVLD AP2: 8.4 CM
BH CV ECHO MEAS - LVLD AP4: 9.2 CM
BH CV ECHO MEAS - LVLS AP2: 7.5 CM
BH CV ECHO MEAS - LVLS AP4: 8.3 CM
BH CV ECHO MEAS - LVOT AREA (M): 3.1 CM^2
BH CV ECHO MEAS - LVOT AREA: 3.1 CM^2
BH CV ECHO MEAS - LVOT DIAM: 2 CM
BH CV ECHO MEAS - LVPWD: 1.3 CM
BH CV ECHO MEAS - MED PEAK E' VEL: 8.8 CM/SEC
BH CV ECHO MEAS - MEDIAL E/E' RATIO: 8.6
BH CV ECHO MEAS - MV A MAX VEL: 27.4 CM/SEC
BH CV ECHO MEAS - MV DEC SLOPE: 122 CM/SEC^2
BH CV ECHO MEAS - MV DEC TIME: 0.43 SEC
BH CV ECHO MEAS - MV E MAX VEL: 75.5 CM/SEC
BH CV ECHO MEAS - MV E/A: 2.8
BH CV ECHO MEAS - PA ACC TIME: 0.14 SEC
BH CV ECHO MEAS - PA PR(ACCEL): 17.4 MMHG
BH CV ECHO MEAS - RAP SYSTOLE: 8 MMHG
BH CV ECHO MEAS - RVDD: 3.1 CM
BH CV ECHO MEAS - RVSP: 21 MMHG
BH CV ECHO MEAS - SI(CUBED): 44.4 ML/M^2
BH CV ECHO MEAS - SI(LVOT): 35.7 ML/M^2
BH CV ECHO MEAS - SI(MOD-SP2): 24.8 ML/M^2
BH CV ECHO MEAS - SI(MOD-SP4): 55 ML/M^2
BH CV ECHO MEAS - SI(TEICH): 37.2 ML/M^2
BH CV ECHO MEAS - SV(CUBED): 92.2 ML
BH CV ECHO MEAS - SV(LVOT): 74.1 ML
BH CV ECHO MEAS - SV(MOD-SP2): 51.4 ML
BH CV ECHO MEAS - SV(MOD-SP4): 114.1 ML
BH CV ECHO MEAS - SV(TEICH): 77.3 ML
BH CV ECHO MEAS - TAPSE (>1.6): 3.1 CM2
BH CV ECHO MEAS - TR MAX PG: 13 MMHG
BH CV ECHO MEAS - TR MAX VEL: 179 CM/SEC
BH CV ECHO MEASUREMENTS AVERAGE E/E' RATIO: 7.26
BH CV VAS BP LEFT ARM: NORMAL MMHG
BH CV XLRA - RV BASE: 4.9 CM
BH CV XLRA - RV LENGTH: 6.4 CM
BH CV XLRA - RV MID: 2.6 CM
BH CV XLRA - TDI S': 19 CM/SEC
LEFT ATRIUM VOLUME INDEX: 31.9 ML/M^2
LEFT ATRIUM VOLUME: 66.3 ML
MAXIMAL PREDICTED HEART RATE: 146 BPM
STRESS TARGET HR: 124 BPM

## 2019-10-01 PROCEDURE — 93306 TTE W/DOPPLER COMPLETE: CPT

## 2019-10-01 PROCEDURE — 99213 OFFICE O/P EST LOW 20 MIN: CPT | Performed by: INTERNAL MEDICINE

## 2019-10-01 PROCEDURE — 93306 TTE W/DOPPLER COMPLETE: CPT | Performed by: INTERNAL MEDICINE

## 2019-10-01 NOTE — PROGRESS NOTES
OFFICE FOLLOW UP     Date of Encounter:10/01/2019     Name: Suad Perry  : 1945  Address: [address]  [place] [address]    PCP: Rima Adams MD  [address]   [place] [id number]    Suad Perry is a 73 y.o. male.      Chief Complaint: Follow up of chest pain (recent ER visit), VHD (echo today)    Problem List:   1. Dyspnea on exertion:  a. Cardiopulmonary exercise testing (CPX) done 10/02/2014 showing normal spirometry and exercise tolerance, stopped test due to blood pressure of 205/104 after only 5 minutes.   b. Left heart catheterization, 2014:  Showing normal LV function, normal pulmonary artery pressures, normal coronary arteries and moderate to severe aortic insufficiency.    2. Valvular heart disease:   a. Echocardiogram, 2014:  Ejection fraction of 55% to 60%, bicuspid aortic valve with moderate aortic insufficiency, mild tricuspid regurgitation, trace mitral regurgitation, RVSP of 25-30 mmHg.    b. Transesophageal echocardiogram, 2014:  Showing bicuspid aortic valve with moderate aortic insufficiency, mild mitral regurgitation with bileaflet prolapse.    c. Echocardiogram 2017: Normal EF, mild MR, aortic valve sclerosis with mild AI, no PAH  d. Echo, 10/1/19:  Unchanged.  3. Hypertension  4. Benign prostatic hypertrophy/nocturia.   5. Osteoarthritis.   6. Macular degeneration.   7. Suspected obstructive sleep apnea, refused sleep study.   8. History of hypothyroidism, “low functioning” thyroid:   a. No longer on medication with a normal TSH.    9. Surgical history:   a. Bilateral knee replacement.   b. Right inguinal hernia repair.     Allergies:  Allergies   Allergen Reactions   • No Known Drug Allergy        Current Medications:  •  amLODIPine (NORVASC) 5 MG tablet, Take 5 mg by mouth Daily  •  chlorthalidone (HYGROTON) 25 MG tablet, Take 1 tablet by mouth Daily.  •  CIALIS 5 MG tablet, Take 5 mg by mouth Daily.  •  diclofenac (VOLTAREN) 75 MG  "EC tablet, Take 75 mg by mouth Daily  •  Misc Natural Products (OSTEO BI-FLEX JOINT SHIELD PO), Take  by mouth Daily.   •  Multiple Vitamin (MULTI VITAMIN PO), Take 1 tablet by mouth Daily  •  prednisoLONE acetate (PRED FORTE) 1 % ophthalmic suspension, Administer  to both eyes 3 (Three) Times a Day  •  valsartan (DIOVAN) 320 MG tablet, Take 1 tablet by mouth Daily    History of Present Illness:  Suad Perry returns for follow up today. He was in the ER at [facility] 2 days ago for chest pain. This was a constant pain and not associated with radiating to arm or jaw, diaphoresis, or nausea. He was given Toradol and pain has resolved. This has not recurred. He is active, going to the gym with his wife. He is able to exercise without exertional discomfort or shortness of breath. Echocardiogram was repeated today to check aortic insufficiency.      The following portions of the patient's history were reviewed and updated as appropriate: allergies, current medications and problem list.    ROS: Pertinent positives as listed in the HPI.  All other systems reviewed and negative.    Objective:    Vitals:    10/01/19 1140 10/01/19 1141   BP: 116/62 95/54   BP Location: Left arm Left arm   Patient Position: Sitting Standing   Pulse: 65 74   Weight: 93 kg (205 lb)    Height: 177.8 cm (70\")        Physical Exam:  GENERAL: Alert, cooperative, in no acute distress.   HEENT: Normocephalic, no adenopathy, no jugular venous distention  HEART: No discrete PMI is noted. Regular rhythm, normal rate, and no murmurs, gallops, or rubs. I DO NOT hear AI.  LUNGS: Clear to auscultation bilaterally. No wheezing, rales or ronchi.  ABDOMEN: Soft, bowel sounds present, non-tender   NEUROLOGIC: No focal abnormalities involving strength or sensation are noted.   EXTREMITIES: No clubbing, cyanosis, or edema noted.       Diagnostic Data:    Lab Results   Component Value Date    GLUCOSE 101 (H) 09/29/2019    BUN 19 09/29/2019    CREATININE 1.01 " 09/29/2019    EGFRIFNONA 72 09/29/2019    BCR 18.8 09/29/2019    K 4.0 09/29/2019    CO2 25.0 09/29/2019    CALCIUM 9.0 09/29/2019    ALBUMIN 4.10 09/29/2019    AST 18 09/29/2019    ALT 16 09/29/2019      Lab Results   Component Value Date    WBC 7.18 09/29/2019    HGB 12.5 (L) 09/29/2019    HCT 37.4 (L) 09/29/2019    MCV 94.0 09/29/2019     09/29/2019     proBNP                             174.3                                                              09/29/2019    Procedures      Assessment and Plan:   1.  VHD: Trace AI by echo today. No change in treatment. Will not repeat the echo next year.  2.  HTN: At target today, continue current medications.   3.  Chest pain:  Noncardiac pain. Normal coronaries by cath in 2014.     I will see Ora C Main back in one year or sooner on an as needed basis.    Scribed for Daryl Cotton MD by Radha Fernandez RN. 10/01/2019 12:15 PM.

## 2019-10-03 RX ORDER — CHLORTHALIDONE 25 MG/1
TABLET ORAL
Qty: 30 TABLET | Refills: 11 | Status: SHIPPED | OUTPATIENT
Start: 2019-10-03 | End: 2020-10-06 | Stop reason: SDUPTHER

## 2019-12-27 RX ORDER — VALSARTAN 320 MG/1
TABLET ORAL
Qty: 30 TABLET | Refills: 2 | Status: SHIPPED | OUTPATIENT
Start: 2019-12-27 | End: 2020-03-27

## 2020-01-10 RX ORDER — AMLODIPINE BESYLATE 10 MG/1
TABLET ORAL
Qty: 90 TABLET | Refills: 2 | Status: SHIPPED | OUTPATIENT
Start: 2020-01-10 | End: 2021-04-05

## 2020-03-27 RX ORDER — VALSARTAN 320 MG/1
TABLET ORAL
Qty: 90 TABLET | Refills: 1 | Status: SHIPPED | OUTPATIENT
Start: 2020-03-27 | End: 2020-09-28

## 2020-09-28 RX ORDER — VALSARTAN 320 MG/1
TABLET ORAL
Qty: 30 TABLET | Refills: 0 | Status: SHIPPED | OUTPATIENT
Start: 2020-09-28 | End: 2020-11-02

## 2020-09-30 ENCOUNTER — TELEPHONE (OUTPATIENT)
Dept: CARDIOLOGY | Facility: CLINIC | Age: 75
End: 2020-09-30

## 2020-09-30 NOTE — TELEPHONE ENCOUNTER
Pt called to confirm DERRELL Garcia received his list of BP readings that he faxed over. Also asked if he needed to come in an hour early for his follow up apt to have an EKG done. I told him this would not be necessary. If GAMALIEL wants one done it can be completed during the rooming process. He verbalized understanding and his appointment time was confirmed.

## 2020-10-06 ENCOUNTER — OFFICE VISIT (OUTPATIENT)
Dept: CARDIOLOGY | Facility: CLINIC | Age: 75
End: 2020-10-06

## 2020-10-06 VITALS
DIASTOLIC BLOOD PRESSURE: 54 MMHG | HEIGHT: 70 IN | SYSTOLIC BLOOD PRESSURE: 124 MMHG | HEART RATE: 56 BPM | BODY MASS INDEX: 29.6 KG/M2 | WEIGHT: 206.8 LBS

## 2020-10-06 DIAGNOSIS — I10 ESSENTIAL HYPERTENSION: ICD-10-CM

## 2020-10-06 DIAGNOSIS — I38 VALVULAR HEART DISEASE: Primary | ICD-10-CM

## 2020-10-06 PROCEDURE — 99213 OFFICE O/P EST LOW 20 MIN: CPT | Performed by: INTERNAL MEDICINE

## 2020-10-06 RX ORDER — CHLORTHALIDONE 25 MG/1
25 TABLET ORAL DAILY
Qty: 30 TABLET | Refills: 11 | Status: SHIPPED | OUTPATIENT
Start: 2020-10-06 | End: 2021-11-08

## 2020-10-06 NOTE — PROGRESS NOTES
OFFICE FOLLOW UP     Date of Encounter:10/06/2020     Name: Suad Perry  : 1945  Address: [address]  [place] [address]    PCP: Rima Adams MD  [address]   [place] [address]    Suad Perry is a 74 y.o. male.      Chief Complaint: Follow up of VHD, HTN    Problem List:   1. Dyspnea on exertion:  a. Cardiopulmonary exercise testing (CPX) done 10/02/2014 showing normal spirometry and exercise tolerance, stopped test due to blood pressure of 205/104 after only 5 minutes.   b. Left heart catheterization, 2014:  Showing normal LV function, normal pulmonary artery pressures, normal coronary arteries and moderate to severe aortic insufficiency.    c. Echo, 10/1/2019: trace AI.   2. Valvular heart disease:   a. Echocardiogram, 2014:  Ejection fraction of 55% to 60%, bicuspid aortic valve with moderate aortic insufficiency, mild tricuspid regurgitation, trace mitral regurgitation, RVSP of 25-30 mmHg.    b. Transesophageal echocardiogram, 2014:  Showing bicuspid aortic valve with moderate aortic insufficiency, mild mitral regurgitation with bileaflet prolapse.    c. Echocardiogram 2017: Normal EF, mild MR, aortic valve sclerosis with mild AI, no PAH  d. Echo, 10/1/19:  Unchanged.  3. Hypertension  4. Benign prostatic hypertrophy/nocturia.   5. Osteoarthritis.   6. Macular degeneration.   7. Suspected obstructive sleep apnea, refused sleep study.   8. History of hypothyroidism, “low functioning” thyroid:   a. No longer on medication with a normal TSH.    9. Surgical history:   a. Bilateral knee replacement.   b. Right inguinal hernia repair.     Allergies:  Allergies   Allergen Reactions   • No Known Drug Allergy        Current Medications:  •  amLODIPine (NORVASC) 10 MG tablet, TAKE ONE TABLET BY MOUTH DAILY  •  chlorthalidone (HYGROTON) 25 MG tablet, TAKE ONE TABLET BY MOUTH DAILY (BEEN OUT FOR THE LAST MONTH)  •  CIALIS 5 MG tablet, Take 5 mg by mouth Daily.  "Taking PRN due to expense.  •  diclofenac (VOLTAREN) 75 MG EC tablet, Take 75 mg by mouth Daily.  •  Multiple Vitamin (MULTI VITAMIN PO), Take 1 tablet by mouth Daily  •  prednisoLONE acetate (PRED FORTE) 1 % ophthalmic suspension, Administer  to both eyes 3 (Three) Times a Day.   •  valsartan (DIOVAN) 320 MG tablet, TAKE ONE TABLET BY MOUTH DAILY    History of Present Illness:  Suad Perry returns for follow up today. He tracks home blood pressures regularly and brings a record for review today. He has been \"out\" of his chlorthalidone for the last month. He reports PCP started him on Cialis regularly to help his blood pressure but he is not taking due to expense. He denies unusual shortness of breath, chest pain, edema, and syncope. He is active and recently hiked in [place].     The following portions of the patient's history were reviewed and updated as appropriate: allergies, current medications and problem list.    ROS: Pertinent positives as listed in the HPI.  All other systems reviewed and negative.    Objective:    Vitals:    10/06/20 1141 10/06/20 1144   BP: 141/55 124/54   BP Location: Right arm Right arm   Patient Position: Sitting Standing   Pulse: 51 56   Weight: 93.8 kg (206 lb 12.8 oz)    Height: 177.8 cm (70\")        Physical Exam:  GENERAL: Alert, cooperative, in no acute distress.   HEENT: Normocephalic, no adenopathy, no jugular venous distention.  HEART: No discrete PMI is noted. Regular rhythm, normal rate, and no gallops, or rubs. Grade 2 aortic sclerosis murmur without insufficiency.  LUNGS: Clear to auscultation bilaterally. No wheezing, rales or ronchi.  ABDOMEN: Soft, bowel sounds present, non-tender   NEUROLOGIC: No focal abnormalities involving strength or sensation are noted.   EXTREMITIES: No clubbing, cyanosis, or edema noted.     Diagnostic Data:  No new labs available to review.     Procedures      Assessment and Plan:   1.  VHD:  Stable, We will see him back next year with " same day echocardiogram.   2.  HTN:  We will not make changes today other than refill his chlorthalidone. He will continue to check home blood pressures. We will obtain labs from PCP done earlier this year.     I will see Suad Perry back in one year or sooner on an as needed basis.  Scribed for Daryl Cotton MD by Radha Fernandez RN. 10/06/2020 12:35 EDT.         EMR Dragon/Transcription Disclaimer:  Much of this encounter note is an electronic transcription/translation of spoken language to printed text.  The electronic translation of spoken language may permit erroneous, or at times, nonsensical words or phrases to be inadvertently transcribed.  Although I have reviewed the note for such errors, some may still exist.

## 2020-11-02 RX ORDER — VALSARTAN 320 MG/1
TABLET ORAL
Qty: 30 TABLET | Refills: 11 | Status: SHIPPED | OUTPATIENT
Start: 2020-11-02

## 2021-04-05 RX ORDER — AMLODIPINE BESYLATE 10 MG/1
TABLET ORAL
Qty: 90 TABLET | Refills: 2 | Status: SHIPPED | OUTPATIENT
Start: 2021-04-05 | End: 2022-04-12 | Stop reason: SDUPTHER

## 2021-06-17 DIAGNOSIS — I34.0 MITRAL VALVE INSUFFICIENCY, UNSPECIFIED ETIOLOGY: Primary | ICD-10-CM

## 2021-06-17 DIAGNOSIS — I38 VALVULAR HEART DISEASE: ICD-10-CM

## 2021-06-17 DIAGNOSIS — I35.1 AORTIC VALVE INSUFFICIENCY, ETIOLOGY OF CARDIAC VALVE DISEASE UNSPECIFIED: ICD-10-CM

## 2021-10-07 ENCOUNTER — APPOINTMENT (OUTPATIENT)
Dept: CARDIOLOGY | Facility: HOSPITAL | Age: 76
End: 2021-10-07

## 2021-11-08 DIAGNOSIS — I10 ESSENTIAL HYPERTENSION: ICD-10-CM

## 2021-11-08 RX ORDER — CHLORTHALIDONE 25 MG/1
TABLET ORAL
Qty: 90 TABLET | Refills: 0 | Status: SHIPPED | OUTPATIENT
Start: 2021-11-08 | End: 2021-11-08

## 2021-11-08 RX ORDER — CHLORTHALIDONE 25 MG/1
25 TABLET ORAL DAILY
Qty: 90 TABLET | Refills: 2 | Status: SHIPPED | OUTPATIENT
Start: 2021-11-08

## 2021-11-08 NOTE — TELEPHONE ENCOUNTER
Next appt 6/1/2022, labs requested from PCP  Normal renal function 10/2020    Labs received - dated 6/2021: BUN 25, CR 1.01, GFR 72

## 2022-04-12 RX ORDER — AMLODIPINE BESYLATE 10 MG/1
10 TABLET ORAL DAILY
Qty: 90 TABLET | Refills: 2 | Status: SHIPPED | OUTPATIENT
Start: 2022-04-12

## 2023-05-22 RX ORDER — AMLODIPINE BESYLATE 10 MG/1
10 TABLET ORAL DAILY
Qty: 30 TABLET | Refills: 0 | Status: SHIPPED | OUTPATIENT
Start: 2023-05-22

## 2023-06-19 RX ORDER — AMLODIPINE BESYLATE 10 MG/1
TABLET ORAL
Qty: 30 TABLET | Refills: 0 | Status: SHIPPED | OUTPATIENT
Start: 2023-06-19

## 2023-10-23 ENCOUNTER — OFFICE VISIT (OUTPATIENT)
Dept: ORTHOPEDIC SURGERY | Facility: CLINIC | Age: 78
End: 2023-10-23
Payer: COMMERCIAL

## 2023-10-23 VITALS
BODY MASS INDEX: 29.03 KG/M2 | DIASTOLIC BLOOD PRESSURE: 56 MMHG | SYSTOLIC BLOOD PRESSURE: 142 MMHG | HEIGHT: 69 IN | WEIGHT: 196 LBS

## 2023-10-23 DIAGNOSIS — Z96.653 HISTORY OF BILATERAL KNEE REPLACEMENT: Primary | ICD-10-CM

## 2023-10-23 DIAGNOSIS — Z09 POSTOPERATIVE EXAMINATION: ICD-10-CM

## 2023-10-23 PROCEDURE — 99212 OFFICE O/P EST SF 10 MIN: CPT | Performed by: ORTHOPAEDIC SURGERY

## 2023-10-23 NOTE — PROGRESS NOTES
[facility] Note    Subjective     Chief Complaint   Patient presents with    Left Knee - Pain     Had left knee replaced in 2013     Right Knee - Pain     Had right knee replaced in 2013         RASTA Perry is a 77 y.o. male who presents with new problem of: bilateral knee pain.  Onset:  history of bilateral knee replacements . The issue has been ongoing for 10 year(s). Pain is a 0/10 on the pain scale. Pain is described as  none . Associated symptoms include  occasional clicking . The pain is worse with  none ; nothing needed to improve the pain. Previous treatments have included: nothing.  100% improvement compared to his preoperative symptoms.  Fully ambulatory without external aids.  Pleased with results.    I have reviewed the following portions of the patient's history and agree with: History of Present Illness and Review of Systems    Patient Active Problem List   Diagnosis    Hypertension    Dyspnea on exertion    Valvular heart disease    BPH (benign prostatic hypertrophy)    Osteoarthritis    Macular degeneration    YUNG (obstructive sleep apnea)    Hypothyroidism     Past Medical History:   Diagnosis Date    BPH (benign prostatic hypertrophy)     Nocturia    Dyspnea on exertion     Hypertension     Recently diagnosed 6 months ago    Hypothyroidism     History of hypothyroidism, “low functioning” thyroid: No longer on medication with normal TSH    Macular degeneration     YUNG (obstructive sleep apnea)     Suspected obstructive sleep apnea, refused sleep study.     Osteoarthritis     Valvular heart disease       Past Surgical History:   Procedure Laterality Date    HERNIA REPAIR      Right inguinal hernia repair.      REPLACEMENT TOTAL KNEE BILATERAL        Family History   Problem Relation Age of Onset    Osteoarthritis Mother     Hypertension Mother     Heart disease Father         CAD    Hypertension Sister     Hypertension Sister      Social History     Socioeconomic  "History    Marital status:    Tobacco Use    Smoking status: Never    Smokeless tobacco: Never   Vaping Use    Vaping Use: Never used   Substance and Sexual Activity    Alcohol use: Yes     Alcohol/week: 3.0 - 6.0 standard drinks of alcohol     Types: 1 - 2 Glasses of wine, 1 - 2 Cans of beer, 1 - 2 Shots of liquor per week     Comment: weekends    Drug use: No    Sexual activity: Defer      Current Outpatient Medications on File Prior to Visit   Medication Sig Dispense Refill    amLODIPine (NORVASC) 10 MG tablet TAKE 1 TABLET BY MOUTH DAILY NEED FOLLOW UP APPOINTMENT FOR REFILLS 30 tablet 0    chlorthalidone (HYGROTON) 25 MG tablet Take 1 tablet by mouth Daily. 90 tablet 2    CIALIS 5 MG tablet Take 1 tablet by mouth Daily.      diclofenac (VOLTAREN) 75 MG EC tablet Take 1 tablet by mouth Daily.      Multiple Vitamin (MULTI VITAMIN PO) Take 1 tablet by mouth Daily.      valsartan (DIOVAN) 320 MG tablet TAKE ONE TABLET BY MOUTH DAILY 30 tablet 11    [DISCONTINUED] prednisoLONE acetate (PRED FORTE) 1 % ophthalmic suspension Administer  to both eyes 3 (Three) Times a Day.       No current facility-administered medications on file prior to visit.      Allergies   Allergen Reactions    No Known Drug Allergy         Review of Systems   Constitutional: Negative.    HENT: Negative.     Eyes: Negative.    Respiratory: Negative.     Cardiovascular: Negative.    Gastrointestinal: Negative.    Endocrine: Negative.    Genitourinary: Negative.    Musculoskeletal:  Positive for arthralgias.   Skin: Negative.    Allergic/Immunologic: Negative.    Neurological: Negative.    Hematological: Negative.    Psychiatric/Behavioral: Negative.          Objective      Physical Exam  /56   Ht 175.3 cm (69\")   Wt 88.9 kg (196 lb)   BMI 28.94 kg/m²     Body mass index is 28.94 kg/m².    General:   Mental Status:  Alert   Appearance: Cooperative, in no acute distress   Build and Nutrition: Well-nourished well-developed " male   Orientation: Alert and oriented to person, place and time   Posture: Normal   Gait: Nonantalgic/normal    Integument:              Right knee: Wound is well-healed              Left knee: Wound is well-healed      Lower Extremities:              Right Knee:                          Tenderness:    None                          Effusion:          None                          Swelling:          None                          Crepitus:          None                          Atrophy:           None                          Range of motion:        Extension:       0°                                                              Flexion:           120°  Instability:        No varus laxity, no valgus laxity, negative anterior drawer  Deformities:     None              Left Knee:                          Tenderness:    Mild medial/lateral joint line tenderness                          Effusion:          None                          Swelling:          None                          Crepitus:          None                          Atrophy: None                          Range of motion:        Extension:       0°                                                              Flexion:           120°  Instability:        No varus laxity, no valgus laxity, negative anterior drawer  Deformities:     None    Imaging/Studies      Imaging Results (Last 24 Hours)       Procedure Component Value Units Date/Time    XR Knee 3 View Bilateral [[id number]] Resulted: 10/23/23 0857     Updated: 10/23/23 0858    Narrative:      Right Knee Radiographs  Indication: status-post right total knee arthroplasty  Views: AP, lateral, and sunrise views of the right knee    Comparison: no change compared to prior study, 11/1/2017    Findings:   The components are well aligned, with no signs of loosening or failure.     Left Knee Radiographs  Indication: status-post left total knee arthroplasty  Views: AP, lateral, and sunrise views of the left  knee    Comparison: no change compared to prior study, 11/1/2017    Findings:   The components are well aligned, with no signs of loosening or failure.            Assessment and Plan     Diagnoses and all orders for this visit:    1. History of bilateral knee replacement (Primary)  -     XR Knee 3 View Bilateral    2. Postoperative examination        1. History of bilateral knee replacement    2. Postoperative examination        I reviewed my findings with the patient.  Both of his knee replacements are functioning well 10 years out and he is pleased with the results.  I will see him back in 5 years, which will be a 15-year checkup with x-rays.  I will see him back sooner for any problems.    Return in about 5 years (around 10/23/2028) for Recheck with X-Rays.      Daryl Reyes MD  10/23/23  09:05 EDT

## 2024-03-21 ENCOUNTER — TRANSCRIBE ORDERS (OUTPATIENT)
Dept: ADMINISTRATIVE | Facility: HOSPITAL | Age: 79
End: 2024-03-21
Payer: COMMERCIAL

## 2024-03-21 ENCOUNTER — HOSPITAL ENCOUNTER (OUTPATIENT)
Dept: GENERAL RADIOLOGY | Facility: HOSPITAL | Age: 79
Discharge: HOME | End: 2024-03-21
Admitting: FAMILY MEDICINE
Payer: COMMERCIAL

## 2024-03-21 DIAGNOSIS — J20.9 ACUTE BRONCHITIS, UNSPECIFIED ORGANISM: Primary | ICD-10-CM

## 2024-03-21 PROCEDURE — 71046 X-RAY EXAM CHEST 2 VIEWS: CPT

## 2024-07-30 ENCOUNTER — HOSPITAL ENCOUNTER (OUTPATIENT)
Dept: GENERAL RADIOLOGY | Facility: HOSPITAL | Age: 79
Discharge: HOME | End: 2024-07-30
Admitting: FAMILY MEDICINE
Payer: COMMERCIAL

## 2024-07-30 ENCOUNTER — TRANSCRIBE ORDERS (OUTPATIENT)
Dept: GENERAL RADIOLOGY | Facility: HOSPITAL | Age: 79
End: 2024-07-30
Payer: COMMERCIAL

## 2024-07-30 DIAGNOSIS — M20.62 ACQUIRED DEFORMITY OF LEFT TOE: ICD-10-CM

## 2024-07-30 DIAGNOSIS — M76.62 TENDONITIS, ACHILLES, LEFT: Primary | ICD-10-CM

## 2024-07-30 PROCEDURE — 73630 X-RAY EXAM OF FOOT: CPT

## 2024-09-13 ENCOUNTER — LAB (OUTPATIENT)
Dept: LAB | Facility: HOSPITAL | Age: 79
End: 2024-09-13
Payer: COMMERCIAL

## 2024-09-13 DIAGNOSIS — E78.00 PURE HYPERCHOLESTEROLEMIA: Primary | ICD-10-CM

## 2024-09-13 LAB
ALBUMIN SERPL-MCNC: 4.3 G/DL (ref 3.5–5.2)
ALBUMIN/GLOB SERPL: 2 G/DL
ALP SERPL-CCNC: 57 U/L (ref 39–117)
ALT SERPL W P-5'-P-CCNC: 21 U/L (ref 1–41)
ANION GAP SERPL CALCULATED.3IONS-SCNC: 10.4 MMOL/L (ref 5–15)
AST SERPL-CCNC: 22 U/L (ref 1–40)
BILIRUB SERPL-MCNC: 0.5 MG/DL (ref 0–1.2)
BUN SERPL-MCNC: 23 MG/DL (ref 8–23)
BUN/CREAT SERPL: 20 (ref 7–25)
CALCIUM SPEC-SCNC: 9.8 MG/DL (ref 8.6–10.5)
CHLORIDE SERPL-SCNC: 104 MMOL/L (ref 98–107)
CHOLEST SERPL-MCNC: 114 MG/DL (ref 0–200)
CO2 SERPL-SCNC: 25.6 MMOL/L (ref 22–29)
CREAT SERPL-MCNC: 1.15 MG/DL (ref 0.76–1.27)
EGFRCR SERPLBLD CKD-EPI 2021: 64.7 ML/MIN/1.73
GLOBULIN UR ELPH-MCNC: 2.2 GM/DL
GLUCOSE SERPL-MCNC: 86 MG/DL (ref 65–99)
HDLC SERPL-MCNC: 35 MG/DL (ref 40–60)
LDLC SERPL CALC-MCNC: 59 MG/DL (ref 0–100)
LDLC/HDLC SERPL: 1.63 {RATIO}
POTASSIUM SERPL-SCNC: 4.4 MMOL/L (ref 3.5–5.2)
PROT SERPL-MCNC: 6.5 G/DL (ref 6–8.5)
SODIUM SERPL-SCNC: 140 MMOL/L (ref 136–145)
TRIGL SERPL-MCNC: 110 MG/DL (ref 0–150)
TSH SERPL DL<=0.05 MIU/L-ACNC: 5.11 UIU/ML (ref 0.27–4.2)
VLDLC SERPL-MCNC: 20 MG/DL (ref 5–40)

## 2024-09-13 PROCEDURE — 36415 COLL VENOUS BLD VENIPUNCTURE: CPT

## 2024-09-13 PROCEDURE — 80061 LIPID PANEL: CPT

## 2024-09-13 PROCEDURE — 80053 COMPREHEN METABOLIC PANEL: CPT

## 2024-09-13 PROCEDURE — 84443 ASSAY THYROID STIM HORMONE: CPT

## 2024-11-26 ENCOUNTER — TELEPHONE (OUTPATIENT)
Dept: ORTHOPEDIC SURGERY | Facility: CLINIC | Age: 79
End: 2024-11-26
Payer: COMMERCIAL

## 2024-11-26 RX ORDER — CEPHALEXIN 500 MG/1
CAPSULE ORAL
Qty: 16 CAPSULE | Refills: 0 | Status: SHIPPED | OUTPATIENT
Start: 2024-11-26

## 2024-11-26 NOTE — TELEPHONE ENCOUNTER
PATIENT HAS A COUPLE DENTAL PROCEDURES COMING UP IN JANUARY AND IS REQUESTING ANTIBIOTICS. HE HAS HAD B/L KNEE REPLACEMENTS WITH DR. MELTON IN THE PAST. HIS DENTIST PREVIOUSLY PRESCRIBED HIM CEPHALEXIN 500 MG. HOWEVER, SHE RETIRED.     IF CLINICAL STAFF OR DR. MELTON COULD PLEASE ADVISE.       PHARMACY: [facility] [place]    CALL BACK # [phone]

## 2024-11-26 NOTE — TELEPHONE ENCOUNTER
Called left a voicemail for patient letting him know that the medication was sent to the pharmacy and this was 16 capsules enough for 4 dental procedures.

## 2025-06-17 ENCOUNTER — LAB (OUTPATIENT)
Dept: LAB | Facility: HOSPITAL | Age: 80
End: 2025-06-17
Payer: MEDICARE

## 2025-06-17 ENCOUNTER — TRANSCRIBE ORDERS (OUTPATIENT)
Dept: LAB | Facility: HOSPITAL | Age: 80
End: 2025-06-17
Payer: MEDICARE

## 2025-06-17 DIAGNOSIS — I10 ESSENTIAL HYPERTENSION, MALIGNANT: ICD-10-CM

## 2025-06-17 DIAGNOSIS — Z12.5 SPECIAL SCREENING FOR MALIGNANT NEOPLASM OF PROSTATE: ICD-10-CM

## 2025-06-17 DIAGNOSIS — E78.00 PURE HYPERCHOLESTEROLEMIA: ICD-10-CM

## 2025-06-17 DIAGNOSIS — Z12.5 SPECIAL SCREENING FOR MALIGNANT NEOPLASM OF PROSTATE: Primary | ICD-10-CM

## 2025-06-17 LAB
ALBUMIN SERPL-MCNC: 4.3 G/DL (ref 3.5–5.2)
ALBUMIN/GLOB SERPL: 1.8 G/DL
ALP SERPL-CCNC: 60 U/L (ref 39–117)
ALT SERPL W P-5'-P-CCNC: 28 U/L (ref 1–41)
ANION GAP SERPL CALCULATED.3IONS-SCNC: 10.6 MMOL/L (ref 5–15)
AST SERPL-CCNC: 26 U/L (ref 1–40)
BASOPHILS # BLD AUTO: 0.04 10*3/MM3 (ref 0–0.2)
BASOPHILS NFR BLD AUTO: 0.6 % (ref 0–1.5)
BILIRUB SERPL-MCNC: 0.6 MG/DL (ref 0–1.2)
BUN SERPL-MCNC: 25 MG/DL (ref 8–23)
BUN/CREAT SERPL: 18.7 (ref 7–25)
CALCIUM SPEC-SCNC: 9.7 MG/DL (ref 8.6–10.5)
CHLORIDE SERPL-SCNC: 104 MMOL/L (ref 98–107)
CHOLEST SERPL-MCNC: 131 MG/DL (ref 0–200)
CO2 SERPL-SCNC: 25.4 MMOL/L (ref 22–29)
CREAT SERPL-MCNC: 1.34 MG/DL (ref 0.76–1.27)
DEPRECATED RDW RBC AUTO: 44.3 FL (ref 37–54)
EGFRCR SERPLBLD CKD-EPI 2021: 53.6 ML/MIN/1.73
EOSINOPHIL # BLD AUTO: 0.12 10*3/MM3 (ref 0–0.4)
EOSINOPHIL NFR BLD AUTO: 1.8 % (ref 0.3–6.2)
ERYTHROCYTE [DISTWIDTH] IN BLOOD BY AUTOMATED COUNT: 12.6 % (ref 12.3–15.4)
GLOBULIN UR ELPH-MCNC: 2.4 GM/DL
GLUCOSE SERPL-MCNC: 95 MG/DL (ref 65–99)
HCT VFR BLD AUTO: 41 % (ref 37.5–51)
HDLC SERPL-MCNC: 46 MG/DL (ref 40–60)
HGB BLD-MCNC: 13.8 G/DL (ref 13–17.7)
IMM GRANULOCYTES # BLD AUTO: 0.04 10*3/MM3 (ref 0–0.05)
IMM GRANULOCYTES NFR BLD AUTO: 0.6 % (ref 0–0.5)
LDLC SERPL CALC-MCNC: 64 MG/DL (ref 0–100)
LDLC/HDLC SERPL: 1.34 {RATIO}
LYMPHOCYTES # BLD AUTO: 2.24 10*3/MM3 (ref 0.7–3.1)
LYMPHOCYTES NFR BLD AUTO: 33.9 % (ref 19.6–45.3)
MCH RBC QN AUTO: 32.3 PG (ref 26.6–33)
MCHC RBC AUTO-ENTMCNC: 33.7 G/DL (ref 31.5–35.7)
MCV RBC AUTO: 96 FL (ref 79–97)
MONOCYTES # BLD AUTO: 0.63 10*3/MM3 (ref 0.1–0.9)
MONOCYTES NFR BLD AUTO: 9.5 % (ref 5–12)
NEUTROPHILS NFR BLD AUTO: 3.54 10*3/MM3 (ref 1.7–7)
NEUTROPHILS NFR BLD AUTO: 53.6 % (ref 42.7–76)
NRBC BLD AUTO-RTO: 0 /100 WBC (ref 0–0.2)
PLATELET # BLD AUTO: 226 10*3/MM3 (ref 140–450)
PMV BLD AUTO: 11.9 FL (ref 6–12)
POTASSIUM SERPL-SCNC: 4.4 MMOL/L (ref 3.5–5.2)
PROT SERPL-MCNC: 6.7 G/DL (ref 6–8.5)
PSA SERPL-MCNC: 0.73 NG/ML (ref 0–4)
RBC # BLD AUTO: 4.27 10*6/MM3 (ref 4.14–5.8)
SODIUM SERPL-SCNC: 140 MMOL/L (ref 136–145)
T4 FREE SERPL-MCNC: 1.25 NG/DL (ref 0.92–1.68)
TRIGL SERPL-MCNC: 116 MG/DL (ref 0–150)
TSH SERPL DL<=0.05 MIU/L-ACNC: 5.19 UIU/ML (ref 0.27–4.2)
VLDLC SERPL-MCNC: 21 MG/DL (ref 5–40)
WBC NRBC COR # BLD AUTO: 6.61 10*3/MM3 (ref 3.4–10.8)

## 2025-06-17 PROCEDURE — 80061 LIPID PANEL: CPT

## 2025-06-17 PROCEDURE — 36415 COLL VENOUS BLD VENIPUNCTURE: CPT | Performed by: FAMILY MEDICINE

## 2025-06-17 PROCEDURE — 84443 ASSAY THYROID STIM HORMONE: CPT

## 2025-06-17 PROCEDURE — 85025 COMPLETE CBC W/AUTO DIFF WBC: CPT

## 2025-06-17 PROCEDURE — 84439 ASSAY OF FREE THYROXINE: CPT | Performed by: FAMILY MEDICINE

## 2025-06-17 PROCEDURE — G0103 PSA SCREENING: HCPCS

## 2025-06-17 PROCEDURE — 80053 COMPREHEN METABOLIC PANEL: CPT | Performed by: FAMILY MEDICINE

## 2025-08-13 ENCOUNTER — OFFICE VISIT (OUTPATIENT)
Dept: ORTHOPEDIC SURGERY | Facility: CLINIC | Age: 80
End: 2025-08-13
Payer: MEDICARE

## 2025-08-13 VITALS
HEIGHT: 68 IN | WEIGHT: 199 LBS | SYSTOLIC BLOOD PRESSURE: 118 MMHG | DIASTOLIC BLOOD PRESSURE: 68 MMHG | BODY MASS INDEX: 30.16 KG/M2

## 2025-08-13 DIAGNOSIS — Z96.653 HISTORY OF BILATERAL KNEE REPLACEMENT: Primary | ICD-10-CM

## 2025-08-13 RX ORDER — ROSUVASTATIN CALCIUM 5 MG/1
5 TABLET, COATED ORAL EVERY 24 HOURS
COMMUNITY

## 2025-08-20 ENCOUNTER — OFFICE VISIT (OUTPATIENT)
Dept: ORTHOPEDIC SURGERY | Facility: CLINIC | Age: 80
End: 2025-08-20
Payer: MEDICARE

## 2025-08-20 VITALS
BODY MASS INDEX: 30.16 KG/M2 | DIASTOLIC BLOOD PRESSURE: 64 MMHG | HEIGHT: 68 IN | SYSTOLIC BLOOD PRESSURE: 122 MMHG | WEIGHT: 199 LBS

## 2025-08-20 DIAGNOSIS — M25.552 LEFT HIP PAIN: Primary | ICD-10-CM

## 2025-08-20 PROCEDURE — 1159F MED LIST DOCD IN RCRD: CPT | Performed by: ORTHOPAEDIC SURGERY

## 2025-08-20 PROCEDURE — 3074F SYST BP LT 130 MM HG: CPT | Performed by: ORTHOPAEDIC SURGERY

## 2025-08-20 PROCEDURE — 1160F RVW MEDS BY RX/DR IN RCRD: CPT | Performed by: ORTHOPAEDIC SURGERY

## 2025-08-20 PROCEDURE — 99214 OFFICE O/P EST MOD 30 MIN: CPT | Performed by: ORTHOPAEDIC SURGERY

## 2025-08-20 PROCEDURE — 3078F DIAST BP <80 MM HG: CPT | Performed by: ORTHOPAEDIC SURGERY

## 2025-08-27 ENCOUNTER — OFFICE VISIT (OUTPATIENT)
Dept: ORTHOPEDIC SURGERY | Facility: CLINIC | Age: 80
End: 2025-08-27
Payer: MEDICARE

## 2025-08-27 VITALS
HEIGHT: 68 IN | SYSTOLIC BLOOD PRESSURE: 120 MMHG | WEIGHT: 199 LBS | BODY MASS INDEX: 30.16 KG/M2 | DIASTOLIC BLOOD PRESSURE: 60 MMHG

## 2025-08-27 DIAGNOSIS — M79.672 LEFT FOOT PAIN: Primary | ICD-10-CM

## 2025-08-27 DIAGNOSIS — G62.9 NEUROPATHY: ICD-10-CM

## 2025-08-29 ENCOUNTER — HOSPITAL ENCOUNTER (OUTPATIENT)
Dept: MRI IMAGING | Facility: HOSPITAL | Age: 80
Discharge: HOME | End: 2025-08-29
Payer: MEDICARE

## 2025-08-29 DIAGNOSIS — M25.552 LEFT HIP PAIN: ICD-10-CM

## 2025-08-29 PROCEDURE — 73721 MRI JNT OF LWR EXTRE W/O DYE: CPT
